# Patient Record
Sex: FEMALE | Race: BLACK OR AFRICAN AMERICAN
[De-identification: names, ages, dates, MRNs, and addresses within clinical notes are randomized per-mention and may not be internally consistent; named-entity substitution may affect disease eponyms.]

---

## 2017-09-17 ENCOUNTER — HOSPITAL ENCOUNTER (EMERGENCY)
Dept: HOSPITAL 72 - EMR | Age: 32
Discharge: LEFT BEFORE DISCHARGE | End: 2017-09-17
Payer: COMMERCIAL

## 2017-09-17 VITALS — BODY MASS INDEX: 27.8 KG/M2 | HEIGHT: 66 IN | WEIGHT: 173 LBS

## 2017-09-17 VITALS — SYSTOLIC BLOOD PRESSURE: 107 MMHG | DIASTOLIC BLOOD PRESSURE: 71 MMHG

## 2017-09-17 VITALS — DIASTOLIC BLOOD PRESSURE: 71 MMHG | SYSTOLIC BLOOD PRESSURE: 107 MMHG

## 2017-09-17 DIAGNOSIS — L02.31: Primary | ICD-10-CM

## 2017-09-17 PROCEDURE — 99284 EMERGENCY DEPT VISIT MOD MDM: CPT

## 2017-09-17 NOTE — EMERGENCY ROOM REPORT
History of Present Illness


General


Chief Complaint:  Skin Rash/Abscess


Source:  Patient





Present Illness


HPI


The patient is a 31-year-old female presenting for possible skin infection.  

She states that she noticed pain and swelling to the right buttock area two 

days prior.  Pain is a 7/10 dull ache and worse with touch.  Does not radiate.  

She states that she noticed white discharge from the area yesterday.  She 

denies any fever or chills. 


She denies any injury to the area.  She denies any other symptoms


Allergies:  


Coded Allergies:  


     NO KNOWN ALLERGIES (Unverified  Allergy, Unknown, 1/3/16)





Patient History


Past Medical History:  see triage record


Pertinent Family History:  none


Last Menstrual Period:  09/15/17


Pregnant Now:  No


Reviewed Nursing Documentation:  PMH: Agreed, PSxH: Agreed





Nursing Documentation-PMH


Past Medical History:  No Stated History





Review of Systems


All Other Systems:  negative except mentioned in HPI





Physical Exam





Vital Signs








  Date Time  Temp Pulse Resp B/P (MAP) Pulse Ox O2 Delivery O2 Flow Rate FiO2


 


9/17/17 13:36 97.7 76 18 107/71 100 Room Air  








Sp02 EP Interpretation:  reviewed, normal


General Appearance:  no apparent distress, alert, GCS 15, non-toxic


Head:  normocephalic, atraumatic


Eyes:  bilateral eye normal inspection, bilateral eye PERRL


ENT:  hearing grossly normal, normal pharynx, no angioedema, normal voice


Rectal:  deferred


Genitourinary:  normal inspection, no CVA tenderness


Musculoskeletal:  back normal, gait/station normal, normal range of motion, non-

tender


Neurologic:  alert, oriented x3, responsive, motor strength/tone normal, 

sensory intact, speech normal


Psychiatric:  judgement/insight normal, memory normal, mood/affect normal, no 

suicidal/homicidal ideation


Skin:  rash - 2cm in diameter erythema to R superior buttock. No tracking to 

rectum. No surrounding erythema. TTP. Not fluctuant


Lymphatic:  no adenopathy





Medical Decision Making


PA Attestation


Dr. Vidal is my supervising physician. Patient management was discussed 

with my supervising physician


Diagnostic Impression:  


 Primary Impression:  


 Abscess


ER Course


The patient is a 31-year-old female presenting for possible skin infection.





Differential diagnoses considered but not limited to: abscess, cellulitis, 

insect bite, perianal abscess





PE: afebrile. NAD


Small non fluctuant abscess of R buttock. 





There is no indication for incision and drainage at this time.





The patient will be discharged home with prescription for Keflex and Bactrim.  

She is to follow up with primary doctor.  She is given indications to return 

for I and D.





Last Vital Signs








  Date Time  Temp Pulse Resp B/P (MAP) Pulse Ox O2 Delivery O2 Flow Rate FiO2


 


9/17/17 14:30 97.7 76 18 107/71 100 Room Air  








Status:  improved


Disposition:  HOME, SELF-CARE


Condition:  Improved


Scripts


Bacitracin (Bacitracin) 28.4 Gm Oint...g.


1 APPLIC TOPIC THREE TIMES A DAY, #28 GM


   Prov: TERZIANSACHI P.A.         9/17/17 


Trimethoprim/Sulfamethoxazole 160/800* (BACTRIM DS TABLET*) 1 Each Tablet


1 TAB ORAL TWICE A DAY, #14 TAB


   Prov: TERZIANCARTERY P.A.         9/17/17 


Cephalexin* (KEFLEX*) 500 Mg Capsule


500 MG ORAL EVERY 12 HOURS, #14 CAP 0 Refills


   Prov: TERZIANSACHI P.A.         9/17/17 


Ibuprofen* (MOTRIN*) 600 Mg Tablet


600 MG ORAL Q8H Y for For Pain, #30 TAB 0 Refills


   Prov: TERZIAN,SACHI P.A.         9/17/17


Referrals:  


NOT CHOSEN [facility],REFERRING (PCP)


Patient Instructions:  Abscess





Additional Instructions:  


I discussed my findings with the patient. All questions and concerns have been 

answered. Treatment and medication compliance have been addressed. I advised 

the patient that they need to follow up with PMD in 3-5 days. Return to ED if 

symptoms worsen, new symptoms arise, or if needed for any reason. Patient 

verbalized understanding of discharge instructions.





Please return for further care if the pain worsens, if you notice fever or 

chills, if the abscess grows in size, or for any reason.











SACHI TYSON Sep 17, 2017 20:43

## 2017-10-13 ENCOUNTER — HOSPITAL ENCOUNTER (EMERGENCY)
Dept: HOSPITAL 72 - EMR | Age: 32
Discharge: HOME | End: 2017-10-13
Payer: COMMERCIAL

## 2017-10-13 VITALS — HEIGHT: 66 IN | WEIGHT: 177 LBS | BODY MASS INDEX: 28.45 KG/M2

## 2017-10-13 VITALS — SYSTOLIC BLOOD PRESSURE: 127 MMHG | DIASTOLIC BLOOD PRESSURE: 75 MMHG

## 2017-10-13 DIAGNOSIS — L03.116: Primary | ICD-10-CM

## 2017-10-13 DIAGNOSIS — R21: ICD-10-CM

## 2017-10-13 PROCEDURE — 99284 EMERGENCY DEPT VISIT MOD MDM: CPT

## 2017-10-13 NOTE — EMERGENCY ROOM REPORT
History of Present Illness


General


Chief Complaint:  Skin Rash/Abscess


Source:  Patient





Present Illness


HPI


The patient is a 31-year-old female presenting for possible skin infection.  

She was seen in this emergency department one month prior for the same 

complaint and was placed on Keflex and Bactrim.  She states that she took 3 

days worth.  She states that symptoms initially resolved and then returned.  

Pain is now 8/10 dull aching worse with touch.  She denies any discharge from 

the area.  She denies any other symptoms including N, V, F, chills, SOB, dysuria


Allergies:  


Coded Allergies:  


     NO KNOWN ALLERGIES (Unverified  Allergy, Unknown, 1/3/16)





Patient History


Past Medical History:  see triage record


Pertinent Family History:  none


Reviewed Nursing Documentation:  PMH: Agreed, PSxH: Agreed





Nursing Documentation-PMH


Past Medical History:  No Stated History





Review of Systems


All Other Systems:  negative except mentioned in HPI





Physical Exam





Vital Signs








  Date Time  Temp Pulse Resp B/P (MAP) Pulse Ox O2 Delivery O2 Flow Rate FiO2


 


10/13/17 14:41 98.4 86 20 114/69 99 Room Air  








Sp02 EP Interpretation:  reviewed, normal


General Appearance:  no apparent distress, alert, GCS 15, non-toxic


Head:  normocephalic, atraumatic


Eyes:  bilateral eye normal inspection, bilateral eye PERRL


ENT:  hearing grossly normal, normal pharynx, no angioedema, normal voice


Gastrointestinal:  normal bowel sounds, non tender, soft, non-distended, no 

guarding, no rebound


Musculoskeletal:  back normal, gait/station normal, normal range of motion, non-

tender


Neurologic:  alert, oriented x3, responsive, motor strength/tone normal, 

sensory intact, speech normal


Psychiatric:  judgement/insight normal, memory normal, mood/affect normal, no 

suicidal/homicidal ideation


Skin:  rash - 3cm in diamater indurated erythematous region of the L anterior 

thigh. Tender.


Lymphatic:  no adenopathy





Medical Decision Making


PA Attestation


Dr. Chand is my supervising physician. Patient management was discussed with 

my supervising physician


Diagnostic Impression:  


 Primary Impression:  


 Cellulitis


 Qualified Codes:  L03.116 - Cellulitis of left lower limb


ER Course


The patient is a 31-year-old female presenting for skin infection





Differential diagnoses considered but not limited to: abscess, cellulitis, 

insect bite, contact dermatitis, among others





PE: Afebrile. NAD


3cm in diameter indurated erythematous region of the L anterior thigh. Tender. 

Hot to the touch. No opening. No DC. No Fluctuance. 





The patient is again given prescriptions for antibiotics and this informed that 

she needs to complete them.  She understands





She will follow up with her primary doctor.  ER precautions given





Last Vital Signs








  Date Time  Temp Pulse Resp B/P (MAP) Pulse Ox O2 Delivery O2 Flow Rate FiO2


 


10/13/17 15:30 98.4 84 16 127/75 99 Room Air  








Status:  improved


Disposition:  HOME, SELF-CARE


Condition:  Improved


Scripts


Bacitracin (Bacitracin) 28.4 Gm Oint...g.


1 APPLIC TOPIC THREE TIMES A DAY, #30 GM


   Prov: SACHI TYSON.A.         10/13/17 


Trimethoprim/Sulfamethoxazole 160/800* (BACTRIM DS TABLET*) 1 Each Tablet


1 TAB ORAL TWICE A DAY, #14 TAB


   Prov: SACHI TYSON P.A.         10/13/17 


Cephalexin* (KEFLEX*) 500 Mg Capsule


500 MG ORAL EVERY 6 HOURS, #28 CAP


   Prov: SACHI TYSON P.A.         10/13/17


Referrals:  


[facility],REFERRING (PCP)


Patient Instructions:  Rash





Additional Instructions:  


I discussed my findings with the patient. All questions and concerns have been 

answered. Treatment and medication compliance have been addressed. I advised 

the patient that they need to follow up with PMD in 3-5 days. Return to ED if 

symptoms worsen, new symptoms arise, or if needed for any reason. Patient 

verbalized understanding of discharge instructions.











SACHI TYSON Oct 13, 2017 20:15

## 2017-10-18 ENCOUNTER — HOSPITAL ENCOUNTER (EMERGENCY)
Dept: HOSPITAL 72 - EMR | Age: 32
Discharge: HOME | End: 2017-10-18
Payer: COMMERCIAL

## 2017-10-18 VITALS — SYSTOLIC BLOOD PRESSURE: 104 MMHG | DIASTOLIC BLOOD PRESSURE: 68 MMHG

## 2017-10-18 VITALS — DIASTOLIC BLOOD PRESSURE: 68 MMHG | SYSTOLIC BLOOD PRESSURE: 104 MMHG

## 2017-10-18 VITALS — WEIGHT: 177 LBS | BODY MASS INDEX: 28.45 KG/M2 | HEIGHT: 66 IN

## 2017-10-18 DIAGNOSIS — L02.31: Primary | ICD-10-CM

## 2017-10-18 PROCEDURE — 99284 EMERGENCY DEPT VISIT MOD MDM: CPT

## 2017-10-18 PROCEDURE — 10060 I&D ABSCESS SIMPLE/SINGLE: CPT

## 2017-10-18 NOTE — EMERGENCY ROOM REPORT
History of Present Illness


General


Chief Complaint:  Skin Rash/Abscess


Source:  Patient





Present Illness


HPI


31-year-old female presents to the emergency department complaining of pain 

that is 10 out of 10 in severity, swelling, erythema, tenderness to localized 

area of the right upper buttock.  Patient states she has been taking antibiotic 

for abscess however it is not improving.  Patient states that she initially 

refused incision and drainage however she believes that this time it is 

necessary.  Patient denies trauma or fall.  Patient denies fevers, chills, 

nausea, vomiting, rashes or lesions elsewhere.  Patient states she is up-to-

date with vaccinations.  She denies history of immunocompromise.Denies CP, 

Palpitations, LOC, AMS, dizziness, Changes in Vision, Sensation, paresthesias, 

or a sudden severe headache.


Allergies:  


Coded Allergies:  


     NO KNOWN ALLERGIES (Unverified  Allergy, Unknown, 1/3/16)





Patient History


Past Medical History:  see triage record


Past Surgical History:  none


Pertinent Family History:  none


Pregnant Now:  No


Immunizations:  UTD


Reviewed Nursing Documentation:  PMH: Agreed, PSxH: Agreed





Nursing Documentation-PMH


Past Medical History:  No Stated History





Review of Systems


All Other Systems:  negative except mentioned in HPI





Physical Exam





Vital Signs








  Date Time  Temp Pulse Resp B/P (MAP) Pulse Ox O2 Delivery O2 Flow Rate FiO2


 


10/18/17 13:26 98.2 79 20 104/68 99 Room Air  








Sp02 EP Interpretation:  reviewed, normal


General Appearance:  no apparent distress, alert, GCS 15, non-toxic


Head:  normocephalic, atraumatic


Eyes:  bilateral eye normal inspection, bilateral eye PERRL


ENT:  hearing grossly normal, normal voice


Neck:  full range of motion


Respiratory:  lungs clear, normal breath sounds, speaking full sentences


Cardiovascular #1:  regular rate, rhythm


Musculoskeletal:  back normal, gait/station normal, normal range of motion, non-

tender


Neurologic:  alert, oriented x3, responsive, motor strength/tone normal, 

sensory intact, speech normal


Skin:  no rash, warm/dry, well hydrated, other - 2cm right buttock induration, 

fluctuance, erythema, swelling noted, no blisters, no vesicles.





Procedures


Incision and Drainage


Incision and Drainage :  


   Consent:  Verbal


   Site:  right upper buttock


   Blade Size:  11


   I & D Procedure:  betadine prep


   Wound Location:  other - right upper buttock


   Wound's Depth, Shape:  superficial


   Wound Length (cm):  1


   Wound Explored:  contaminated - 20cc of purulent d/c expressed


   Irrigated w/ Saline (ccs):  500


   Anesthesia:  1% Lidocaine


   Volume Anesthetic (ccs):  2


   Splint Applied?:  No


   Sling Applied?:  No


   Patient Tolerated:  Well


   Complications:  None





Medical Decision Making


PA Attestation


Dr. Babb  is my supervising Physician whom patient management has been 

discussed with.


Diagnostic Impression:  


 Primary Impression:  


 Abscess


ER Course


31-year-old female presents to the emergency department complaining of pain 

that is 10 out of 10 in severity, swelling, erythema, tenderness to localized 

area of the right upper buttock.  Patient states she has been taking antibiotic 

for abscess however it is not improving.  Patient states that she initially 

refused incision and drainage however she believes that this time it is 

necessary.  Patient denies trauma or fall.  Patient denies fevers, chills, 

nausea, vomiting, rashes or lesions elsewhere.  Patient states she is up-to-

date with vaccinations.  She denies history of immunocompromise.Denies CP, 

Palpitations, LOC, AMS, dizziness, Changes in Vision, Sensation, paresthesias, 

or a sudden severe headache.





Ddx considered but are not limited to cellulitis, abscess, cystic acne, 

necrotizing fasciitis, insect bite.





Vital signs: are WNL, pt. is afebrile


H&PE are most consistent with  2 cm abscess of the right upper buttock. 





ORDERS: none required at this time, the diagnosis is clinical





ED INTERVENTIONS:


-I & D.








DISCHARGE: At this time pt. is stable for d/c to home. Will provide printed 

patient care instructions, and any necessary prescriptions. Care plan and 

follow up instructions have been discussed with the patient prior to discharge.





Last Vital Signs








  Date Time  Temp Pulse Resp B/P (MAP) Pulse Ox O2 Delivery O2 Flow Rate FiO2


 


10/18/17 13:36 98.2  20 104/68 99 Room Air  


 


10/18/17 13:26  79      








Disposition:  HOME, SELF-CARE


Condition:  Stable


Scripts


Mupirocin Calcium (Bactroban) 15 Gm Cream..g.


1 APPLIC TOPIC THREE TIMES A DAY, #15 GM


   Prov: Mirta Egan P.A.         10/18/17 


Acetaminophen* (TYLENOL EXTRA STRENGTH*) 500 Mg Tablet


500 MG ORAL Q6H, #20 TAB 0 Refills


   Prov: Mirta Egan P.A.         10/18/17 


Doxycycline Hyclate* (VIBRAMYCIN*) 100 Mg Capsule


100 MG ORAL EVERY 12 HOURS for 7 Days, #14 CAP 0 Refills


   Prov: Mirta Egan         10/18/17


Referrals:  


NON PHYSICIAN (PCP)


Patient Instructions:  Abscess





Additional Instructions:  


Take medications as directed. 


 ** Follow up with a Primary Care Provider in 3-5 days, even if your symptoms 

have resolved. ** 


--Please review list of primary care clinics, if you do not already have a 

primary care provider





Return sooner to ED if new symptoms occur, or current symptoms become worse. 











- Please note that this Emergency Department Report was dictated using [employer] technology software, occasionally this can lead to 

erroneous entry secondary to interpretation by the dictation equipment.











Mirta Egan Oct 18, 2017 14:14

## 2017-10-25 ENCOUNTER — HOSPITAL ENCOUNTER (EMERGENCY)
Dept: HOSPITAL 72 - EMR | Age: 32
Discharge: HOME | End: 2017-10-25
Payer: COMMERCIAL

## 2017-10-25 VITALS — WEIGHT: 171 LBS | HEIGHT: 66 IN | BODY MASS INDEX: 27.48 KG/M2

## 2017-10-25 VITALS — SYSTOLIC BLOOD PRESSURE: 109 MMHG | DIASTOLIC BLOOD PRESSURE: 74 MMHG

## 2017-10-25 VITALS — SYSTOLIC BLOOD PRESSURE: 115 MMHG | DIASTOLIC BLOOD PRESSURE: 78 MMHG

## 2017-10-25 DIAGNOSIS — L02.31: Primary | ICD-10-CM

## 2017-10-25 PROCEDURE — 10060 I&D ABSCESS SIMPLE/SINGLE: CPT

## 2017-10-25 PROCEDURE — 99283 EMERGENCY DEPT VISIT LOW MDM: CPT

## 2017-10-25 NOTE — EMERGENCY ROOM REPORT
History of Present Illness


General


Chief Complaint:  Skin Rash/Abscess


Source:  Patient





Present Illness


HPI


Patient 31-year-old female who presented after several days of increased right-

sided buttock swelling.  Patient prior history of abscess.  She did not take 

her antibiotics after recently having I incision and drainage.  Patient denied 

any fever.  She denied history of HIV or diabetes.  As she denied other 

locations of swelling or pain.  She denied injection drug use.


Allergies:  


Coded Allergies:  


     NO KNOWN ALLERGIES (Unverified  Allergy, Unknown, 1/3/16)





Patient History


Past Medical History:  see triage record


Last Menstrual Period:  Oct 5th, 2017


Reviewed Nursing Documentation:  PMH: Agreed, PSxH: Agreed





Nursing Documentation-PMH


Past Medical History:  No Stated History





Review of Systems


All Other Systems:  negative except mentioned in HPI





Physical Exam





Vital Signs








  Date Time  Temp Pulse Resp B/P (MAP) Pulse Ox O2 Delivery O2 Flow Rate FiO2


 


10/25/17 05:33 97.9 69 14 109/74 99 Room Air  








General Appearance:  well appearing, no apparent distress, alert, GCS 15, non-

toxic


Head:  normocephalic, atraumatic


ENT:  hearing grossly normal, normal voice


Neck:  full range of motion, supple


Respiratory:  no respiratory distress, speaking full sentences


Musculoskeletal:  normal inspection, no calf tenderness


Neurologic:  normal gait


Psychiatric:  mood/affect normal


Skin:  other - fluctuance to right buttock





Procedures


Laceration/Wound Repair


Laceration/Wound Repair :  


   Consent:  Verbal


   Wound's Depth, Shape:  superficial


   Wound Length (cm):  2


   Wound Explored:  clean


   Betadine Prep?:  Yes


   Anesthesia:  Lidocaine w/ Epi


   Volume Anesthetic (ccs):  3


   Wound Debrided:  minimal


   Patient Tolerated:  Well


   Complications:  None





Medical Decision Making


Diagnostic Impression:  


 Primary Impression:  


 Abscess


ER Course


Patient presented for skin rash.  Differential diagnosis included was not 

limited to abscess, cellulitis, folliculitis, Fourniere's gangrene.  The 

patient has abscess incised and drained.The patient is advised to follow up 

with  primary care doctor in 2 days for wound recheck.  Patient is advised to 

return if any worsening condition or if any changes in status that are 

concerning.





Last Vital Signs








  Date Time  Temp Pulse Resp B/P (MAP) Pulse Ox O2 Delivery O2 Flow Rate FiO2


 


10/25/17 06:43  71 20 115/78 100 Room Air  


 


10/25/17 05:54 97.9       








Status:  improved


Disposition:  HOME, SELF-CARE


Condition:  Stable


Referrals:  


[facility],REFERRING (PCP)


Patient Instructions:  August Mack Oct 25, 2017 07:06

## 2017-11-23 ENCOUNTER — HOSPITAL ENCOUNTER (EMERGENCY)
Dept: HOSPITAL 72 - EMR | Age: 32
Discharge: HOME | End: 2017-11-23
Payer: COMMERCIAL

## 2017-11-23 VITALS — SYSTOLIC BLOOD PRESSURE: 118 MMHG | DIASTOLIC BLOOD PRESSURE: 78 MMHG

## 2017-11-23 VITALS — BODY MASS INDEX: 28.12 KG/M2 | HEIGHT: 66 IN | WEIGHT: 175 LBS

## 2017-11-23 VITALS — DIASTOLIC BLOOD PRESSURE: 78 MMHG | SYSTOLIC BLOOD PRESSURE: 118 MMHG

## 2017-11-23 DIAGNOSIS — J06.9: Primary | ICD-10-CM

## 2017-11-23 PROCEDURE — 99282 EMERGENCY DEPT VISIT SF MDM: CPT

## 2017-11-23 NOTE — EMERGENCY ROOM REPORT
History of Present Illness


General


Chief Complaint:  Upper Respiratory Illness


Source:  Patient





Present Illness


[facility]


This is a 31-year-old female with no significant past medical history.  She 

presents with chief complaint of cough for one to 2 days.  Her daughter is sick 

also.  Mom sick also.  She denies any fever chills but no nausea no vomiting.  

No complaint.


Allergies:  


Coded Allergies:  


     NO KNOWN ALLERGIES (Unverified  Allergy, Unknown, 1/3/16)





Patient History


Past Medical History:  see triage record, old chart reviewed


Past Surgical History:  none


Pertinent Family History:  none


Social History:  Denies: smoking


Last Menstrual Period:  


Pregnant Now:  No


:  1


Immunizations:  other


Reviewed Nursing Documentation:  PMH: Agreed, PSxH: Agreed





Nursing Documentation-PMH


Past Medical History:  No Stated History





Review of Systems


Eye:  Denies: eye pain, blurred vision


ENT:  Denies: ear pain, nose congestion, throat swelling


Respiratory:  Reports: cough, Denies: shortness of breath


Cardiovascular:  Denies: chest pain, palpitations


Gastrointestinal:  Denies: abdominal pain, diarrhea, nausea, vomiting


Musculoskeletal:  Denies: back pain, joint pain


Skin:  Denies: rash


Neurological:  Denies: headache, numbness


Endocrine:  Denies: increased thirst, increased urine


Hematologic/Lymphatic:  Denies: easy bruising


All Other Systems:  negative except mentioned in HPI





Physical Exam





Vital Signs








  Date Time  Temp Pulse Resp B/P (MAP) Pulse Ox O2 Delivery O2 Flow Rate FiO2


 


17 20:23 99.0 85 18 118/78 100 Room Air  





vitals normal


Sp02 EP Interpretation:  reviewed, normal


General Appearance:  well appearing, no apparent distress, alert


Head:  normocephalic, atraumatic


Eyes:  bilateral eye PERRL, bilateral eye EOMI


ENT:  hearing grossly normal, normal pharynx


Neck:  full range of motion, supple, no meningismus


Respiratory:  chest non-tender, lungs clear, normal breath sounds


Cardiovascular #1:  regular rate, rhythm, no murmur


Gastrointestinal:  normal bowel sounds, non tender, no mass, no organomegaly, 

no bruit, non-distended


Musculoskeletal:  back normal, gait/station normal, normal range of motion


Psychiatric:  mood/affect normal


Skin:  warm/dry





Medical Decision Making


Diagnostic Impression:  


 Primary Impression:  


 Upper respiratory infection


 Qualified Codes:  J06.9 - Acute upper respiratory infection, unspecified; 

B97.89 - Other viral agents as the cause of diseases classified elsewhere


ER Course


Vision with a viral upper respiratory infection.  Looks well.  No pneumonia.  We

'll discharge home





Last Vital Signs








  Date Time  Temp Pulse Resp B/P (MAP) Pulse Ox O2 Delivery O2 Flow Rate FiO2


 


17 21:00 99.0 85 18 118/78 100 Room Air  








Status:  unchanged


Disposition:  HOME, SELF-CARE


Condition:  Stable


Patient Instructions:  Upper Respiratory Infection, Adult





Additional Instructions:  


followup with  your Dr. in 7 days.  Return if worse.











MELECIO WHITMAN M.D. 2017 21:15

## 2018-01-23 ENCOUNTER — HOSPITAL ENCOUNTER (EMERGENCY)
Dept: HOSPITAL 72 - EMR | Age: 33
Discharge: HOME | End: 2018-01-23
Payer: COMMERCIAL

## 2018-01-23 VITALS — WEIGHT: 175 LBS | BODY MASS INDEX: 28.12 KG/M2 | HEIGHT: 66 IN

## 2018-01-23 VITALS — SYSTOLIC BLOOD PRESSURE: 129 MMHG | DIASTOLIC BLOOD PRESSURE: 79 MMHG

## 2018-01-23 DIAGNOSIS — J02.9: Primary | ICD-10-CM

## 2018-01-23 PROCEDURE — 99283 EMERGENCY DEPT VISIT LOW MDM: CPT

## 2018-01-23 NOTE — EMERGENCY ROOM REPORT
History of Present Illness


General


Chief Complaint:  Sore Throat


Source:  Patient





Present Illness


HPI


Patient presents with throat pain.  This for 2 days.  Pain with swallowing.  

Feverish.  No cough.  Not pregnant.  No NVD.  No rashes.  No chest pain.  Pain 

10/10 sharp and burning, in pharynx, constant but worse when swallowing.  No 

meds taken.





Daughter with earache.





No co-morbidities.


Allergies:  


Coded Allergies:  


     NO KNOWN ALLERGIES (Unverified  Allergy, Unknown, 1/3/16)





Patient History


Social History:  Reports: smoking


Social History Narrative


with daughter


Last Menstrual Period:  Jan 5,2018





Nursing Documentation-[facility]


Past Medical History:  No Stated History





Review of Systems


All Other Systems:  negative except mentioned in HPI





Physical Exam





Vital Signs








  Date Time  Temp Pulse Resp B/P (MAP) Pulse Ox O2 Delivery O2 Flow Rate FiO2


 


1/23/18 21:53 99.7 83 16 129/79 96 Room Air  








Sp02 EP Interpretation:  reviewed, normal


General Appearance:  well appearing, no apparent distress


Head:  normocephalic, atraumatic


Eyes:  bilateral eye normal inspection


ENT:  hearing grossly normal, no angioedema, normal voice, TMs + canals normal, 

tonsillar swelling, pharyngeal erythema


Neck:  full range of motion, supple


Respiratory:  lungs clear, no respiratory distress, speaking full sentences


Cardiovascular #1:  regular rate, rhythm


Gastrointestinal:  non tender, scaphoid


Musculoskeletal:  gait/station normal, normal range of motion


Neurologic:  alert, normal gait, grossly normal


Psychiatric:  mood/affect normal


Skin:  no rash





Medical Decision Making


Diagnostic Impression:  


 Primary Impression:  


 Pharyngitis


 Qualified Codes:  J02.9 - Acute pharyngitis, unspecified


ER Course


Patient with sore throat. DDx; viral, strep amongst others.  Daughter with 

otitis which make bacterial more suspect.   Will treat with antibiotics.  





(Smiling and not appear affected by pain.)





Patient stable for outpatient observation and treatment.





Last Vital Signs








  Date Time  Temp Pulse Resp B/P (MAP) Pulse Ox O2 Delivery O2 Flow Rate FiO2


 


1/23/18 22:57 99.7 83 16 129/79 96 Room Air  








Status:  improved


Disposition:  HOME, SELF-CARE


Condition:  Improved


Scripts


Amoxicillin* (AMOXIL*) 500 Mg Capsule


500 MG ORAL EVERY 8 HOURS, #28 CAP


   Prov: Devon Dolan M.D.         1/23/18


Referrals:  


[facility],REFERRING (PCP)











Devon Dolan M.D. 23, 2018 22:45

## 2018-09-19 ENCOUNTER — HOSPITAL ENCOUNTER (EMERGENCY)
Dept: HOSPITAL 72 - EMR | Age: 33
Discharge: HOME | End: 2018-09-19
Payer: COMMERCIAL

## 2018-09-19 VITALS — DIASTOLIC BLOOD PRESSURE: 89 MMHG | SYSTOLIC BLOOD PRESSURE: 138 MMHG

## 2018-09-19 VITALS — WEIGHT: 180 LBS | BODY MASS INDEX: 28.93 KG/M2 | HEIGHT: 66 IN

## 2018-09-19 DIAGNOSIS — R11.2: ICD-10-CM

## 2018-09-19 DIAGNOSIS — Z72.0: ICD-10-CM

## 2018-09-19 DIAGNOSIS — K52.9: Primary | ICD-10-CM

## 2018-09-19 LAB
APPEARANCE UR: (no result)
APTT PPP: YELLOW S
GLUCOSE UR STRIP-MCNC: NEGATIVE MG/DL
KETONES UR QL STRIP: NEGATIVE
LEUKOCYTE ESTERASE UR QL STRIP: (no result)
NITRITE UR QL STRIP: NEGATIVE
PH UR STRIP: 7 [PH] (ref 4.5–8)
PROT UR QL STRIP: NEGATIVE
SP GR UR STRIP: 1 (ref 1–1.03)
UROBILINOGEN UR-MCNC: NORMAL MG/DL (ref 0–1)

## 2018-09-19 PROCEDURE — 99282 EMERGENCY DEPT VISIT SF MDM: CPT

## 2018-09-19 PROCEDURE — 81025 URINE PREGNANCY TEST: CPT

## 2018-09-19 PROCEDURE — 81003 URINALYSIS AUTO W/O SCOPE: CPT

## 2018-09-19 NOTE — EMERGENCY ROOM REPORT
History of Present Illness


General


Chief Complaint:  Nausea, Vomiting, and Diarrhea


Source:  Patient





Present Illness


HPI


Patient with NVD since last Friday.  Occasionally able to keep down water, but 

now vomiting up bile.  Feels weak when she stands.  Cramping abd pain rated 5/

10 more epigastric and not radiating.  No fever documented but felt feverish 

few days ago.  No dysuria.  Diarrhea normal color without blood.  Watery.  No 

travel or unusual foods.  Tried peptobismol and another OTC med without help.





No URI sy.  No joint pain, rashes.





LNMP 9/5, normal for her.





Denies medical problems.


Allergies:  


Coded Allergies:  


     NO KNOWN ALLERGIES (Unverified  Allergy, Unknown, 1/3/16)





Patient History


Past Medical History:  see triage record


Social History:  Reports: smoking


Social History Narrative


caregiver


Last Menstrual Period:  18


Pregnant Now:  No


:  3


Para:  1


Reviewed Nursing Documentation:  PMH: Agreed; PSxH: Agreed





Nursing Documentation-PMH


Past Medical History:  No Stated History





Review of Systems


All Other Systems:  negative except mentioned in HPI





Physical Exam





Vital Signs








  Date Time  Temp Pulse Resp B/P (MAP) Pulse Ox O2 Delivery O2 Flow Rate FiO2


 


18 10:33 98.2 68 18 138/89 100 Room Air  





 98.2       








General Appearance:  well appearing, no apparent distress, GCS 15


Head:  normocephalic, atraumatic


Eyes:  bilateral eye normal inspection, bilateral eye PERRL


ENT:  hearing grossly normal, normal voice, moist mucus membranes


Neck:  full range of motion, supple


Respiratory:  lungs clear, no respiratory distress, speaking full sentences


Cardiovascular #1:  regular rate, rhythm


Cardiovascular #2:  2+ radial (R)


Gastrointestinal:  normal bowel sounds, soft, non-distended, no guarding, no 

rebound, tenderness - epigastric


Genitourinary:  no CVA tenderness


Musculoskeletal:  no calf tenderness


Neurologic:  alert, normal gait, grossly normal


Psychiatric:  mood/affect normal


Skin:  no rash





Medical Decision Making


Diagnostic Impression:  


 Primary Impression:  


 Gastroenteritis


ER Course


Patient with NVD.  DDx: GItis, pancreatitis, gastritis, pregnancy amongst 

others.  Patient with persistent vomiting.  Evaluation with UA.  Treatment with 

IV hydration, zofran and tylenol.





Vomited tylenol.





UA normal with neg preg.





Tolerating oral intake without difficulty.  Improved.  Abd soft.





Patient stable for outpatient observation and treatment.





Laboratory Tests








Test


  18


10:59


 


Urine Color Yellow  


 


Urine Appearance Cloudy  


 


Urine pH 7 (4.5-8.0)  


 


Urine Specific Gravity


  1.005


(1.005-1.035)


 


Urine Protein


  Negative


(NEGATIVE)


 


Urine Glucose (UA)


  Negative


(NEGATIVE)


 


Urine Ketones


  Negative


(NEGATIVE)


 


Urine Blood


  3+ (NEGATIVE)


H


 


Urine Nitrite


  Negative


(NEGATIVE)


 


Urine Bilirubin


  Negative


(NEGATIVE)


 


Urine Urobilinogen


  Normal MG/DL


(0.0-1.0)


 


Urine Leukocyte Esterase


  1+ (NEGATIVE)


H


 


Urine RBC


  5-10 /HPF (0 -


2)  H


 


Urine WBC


  2-4 /HPF (0 -


2)


 


Urine Squamous Epithelial


Cells Moderate /LPF


(NONE/OCC)  H


 


Urine Bacteria


  Few /HPF


(NONE)


 


Urine HCG, Qualitative


  Negative


(NEGATIVE)











Last Vital Signs








  Date Time  Temp Pulse Resp B/P (MAP) Pulse Ox O2 Delivery O2 Flow Rate FiO2


 


18 12:23 98.2 68 18 138/89 100 Room Air  





 208.8       








Status:  improved


Disposition:  HOME, SELF-CARE


Condition:  Improved


Scripts


Ondansetron Odt* (ZOFRAN ODT*) 4 Mg Tab.rapdis


4 MG BC EVERY 8 HOURS, #4 TAB 1 Refill


   Prov: Devon Dolan M.D.         18


Referrals:  


[facility],REFERRING (PCP)











Devon Dolan M.D. Sep 19, 2018 12:18